# Patient Record
(demographics unavailable — no encounter records)

---

## 2025-01-16 NOTE — PHYSICAL EXAM
[Chaperone Present] : A chaperone was present in the examining room during all aspects of the physical examination [13176] : A chaperone was present during the pelvic exam. [FreeTextEntry1] : General: Well, appearing. Alert and orientated. No acute distress HEENT: Normocephalic, atraumatic and extraocular muscles appear to be intact Neck: Full range of motion, no obvious lymphadenopathy, deformities, or masses noted Respiratory: Speaking in full sentences comfortably, normal work of breathing and no cough during visit Musculoskeletal: full range of motion  Extremities: No upper extremity edema noted Skin: no obvious rash or skin lesions Neuro: Orientated X 3, speech is fluent, normal rate Psych: Normal mood and affect [FreeTextEntry2] : Iveth [Labia Majora] : were normal [Labia Minora] : were normal [Normal Appearance] : general appearance was normal [No Bleeding] : there was no active vaginal bleeding [3] : 3 [Aa ____] : Aa [unfilled] [Ba ____] : Ba [unfilled] [C ____] : C [unfilled] [GH ____] : GH [unfilled] [PB ____] : PB [unfilled] [TVL ____] : TVL  [unfilled] [Ap ____] : Ap [unfilled] [Bp ____] : Bp [unfilled] [D ____] : D [unfilled] [Normal rectal exam] : was normal [Normal] : was normal [FreeTextEntry4] : No mesh visualized

## 2025-01-16 NOTE — HISTORY OF PRESENT ILLNESS
[Unable To Restrain Bowel Movement] : no [Urinary Frequency] : no [Feelings Of Urinary Urgency] : no [Pain During Urination (Dysuria)] : no [Urinary Tract Infection] : no [Stool Visible Blood] : no [Incomplete Emptying Of Stool] : no [Pelvic Pain] : no [Vaginal Pain] : no [Rectal Pain] : no [] : months ago [de-identified] : daily [FreeTextEntry5] : sometimes  [de-identified] : every other day, with coughing, sneezing [de-identified] : 6-8x/d, every 2-3 hours  [de-identified] : 1x/n but urinates because she is already awake to care of the baby  [FreeTextEntry6] : BM every 1-2d, usually soft [de-identified] : sometimes  [FreeTextEntry1] : Ivon is a 38yo with recurrent prolapse and symptoms of stress incontinence. She had an emergency CD on 10/31/24 for fetal distress. The recovery was uncomplicated. She started having leakage with coughing, sneezing and felt "another bulge" about a month after. She has been doing pelvic floor exercises and diaphragmatic breathing with some improvement. Sometimes she feels incomplete rectal emptying when she has a bowel movement. She is breastfeeding and has resumed menstruation. She has not had intercourse since delivery and thinks she has completed her childbearing.   She had a prior vaginal delivery in 2020 that was complicated by retained placenta, postpartum hemorrhage. She also had a RML episiotomy and a 3rd degree that was repaired. After delivery, she developed stress incontinence and a Stage 2 cystocele. She saw a urologist who recommended surgery and had a SSLF and solyx sling placed. Her symptoms had resolved until a few months into her subsequent pregnancy.   PMH: Carpal tunnel  PSH: D+C for retained placenta after first delivery, SSLF/Solyx sling (2021), CDx1  Soc: Never smoked All: NKDA  Daily fluid intake: 32-64oz water + 1c coffee + 1c orange juice.

## 2025-01-16 NOTE — DISCUSSION/SUMMARY
[Visit Time ___ Minutes] : [unfilled] minutes [FreeTextEntry1] : Ivon presents with symptoms of recurrent stress incontinence and Stage 2 anterior and posterior wall prolapse, which was unchanged from her prior exam. We reviewed the pathophysiology of stress incontinence and prolapse and how they are thought to arise from conditions that cause increased pressure on the pelvic floor, such as pregnancy and childbirth. We discussed how there are a lot of changes to the pelvic floor during pregnancy in preparation for delivery and that we can see improvement in pelvic floor symptoms for up to 6 months postpartum. We discussed continuing pelvic floor exercises with or without physical therapy or trying a pessary. With pessaries, we discussed there are ones with a continence knob that may help her urinary symptoms as well and that she can use pessaries as often or as little as she wants. We reviewed the fitting process and need for regular maintenance. We also discussed surgical options with the vaginal approach with a posterior repair, possible anterior repair. Regarding her feeling of incomplete rectal emptying, we discussed that may be from her rectocele.   Regarding her urinary symptoms, images were used to demonstrate her incontinence and treatment options. We reviewed management options for stress urinary incontinence including: observation, pelvic floor exercises with or without a physical therapist, continence devices or pessaries, urethral bulking agents, and surgical management. We discussed surgical management options including a midurethral sling, mackenzie colposuspension, and pubovaginal sling using native tissue. We reviewed risks and benefits of each procedure. If interested in surgery, we discussed obtaining urodynamics.   At this time, Ivon will continue to PFE on her own. If no improvement, she will consider a continence pessary.    [facility] handout on DARREN, PFE given to patient. RTO  for POP, DARREN follow up